# Patient Record
Sex: FEMALE | Race: WHITE | NOT HISPANIC OR LATINO | Employment: UNEMPLOYED | URBAN - METROPOLITAN AREA
[De-identification: names, ages, dates, MRNs, and addresses within clinical notes are randomized per-mention and may not be internally consistent; named-entity substitution may affect disease eponyms.]

---

## 2017-05-11 DIAGNOSIS — G35 MS (MULTIPLE SCLEROSIS) (HCC): ICD-10-CM

## 2017-05-11 RX ORDER — TERIFLUNOMIDE 14 MG/1
14 TABLET, FILM COATED ORAL
Qty: 90 TAB | Refills: 3 | Status: SHIPPED | OUTPATIENT
Start: 2017-05-11 | End: 2018-04-02 | Stop reason: SDUPTHER

## 2018-04-02 DIAGNOSIS — G35 MS (MULTIPLE SCLEROSIS) (HCC): ICD-10-CM

## 2018-04-02 RX ORDER — RENAGEL 800 MG/1
TABLET ORAL
Qty: 84 TAB | Refills: 5 | Status: SHIPPED | OUTPATIENT
Start: 2018-04-02 | End: 2018-10-18 | Stop reason: SDUPTHER

## 2018-10-18 DIAGNOSIS — G35 MS (MULTIPLE SCLEROSIS) (HCC): ICD-10-CM

## 2018-10-18 RX ORDER — TERIFLUNOMIDE 14 MG/1
14 TABLET, FILM COATED ORAL DAILY
Qty: 84 TAB | Refills: 11 | Status: SHIPPED | OUTPATIENT
Start: 2018-10-18 | End: 2023-04-25

## 2018-11-28 ENCOUNTER — OFFICE VISIT (OUTPATIENT)
Dept: NEUROLOGY | Facility: MEDICAL CENTER | Age: 33
End: 2018-11-28
Payer: MEDICAID

## 2018-11-28 VITALS
SYSTOLIC BLOOD PRESSURE: 104 MMHG | TEMPERATURE: 98.8 F | HEART RATE: 76 BPM | WEIGHT: 177.9 LBS | HEIGHT: 72 IN | DIASTOLIC BLOOD PRESSURE: 60 MMHG | OXYGEN SATURATION: 98 % | BODY MASS INDEX: 24.09 KG/M2

## 2018-11-28 DIAGNOSIS — G35 MS (MULTIPLE SCLEROSIS) (HCC): Primary | ICD-10-CM

## 2018-11-28 PROCEDURE — 99214 OFFICE O/P EST MOD 30 MIN: CPT | Performed by: PSYCHIATRY & NEUROLOGY

## 2018-11-28 RX ORDER — GABAPENTIN 300 MG/1
600 CAPSULE ORAL 3 TIMES DAILY
Qty: 270 CAP | Refills: 3 | Status: SHIPPED | OUTPATIENT
Start: 2018-11-28 | End: 2019-05-28

## 2018-11-28 RX ORDER — CYCLOBENZAPRINE HCL 10 MG
10 TABLET ORAL 3 TIMES DAILY PRN
COMMUNITY
End: 2018-11-28 | Stop reason: SDUPTHER

## 2018-11-28 RX ORDER — CYCLOBENZAPRINE HCL 10 MG
10 TABLET ORAL 3 TIMES DAILY PRN
Qty: 120 TAB | Refills: 3 | Status: SHIPPED | OUTPATIENT
Start: 2018-11-28 | End: 2023-04-25

## 2018-11-28 RX ORDER — DULOXETIN HYDROCHLORIDE 60 MG/1
60 CAPSULE, DELAYED RELEASE ORAL DAILY
Qty: 30 CAP | Refills: 2 | Status: SHIPPED | OUTPATIENT
Start: 2018-11-28 | End: 2019-02-15 | Stop reason: SDUPTHER

## 2018-11-28 ASSESSMENT — ENCOUNTER SYMPTOMS
LOSS OF CONSCIOUSNESS: 0
INSOMNIA: 0
TREMORS: 0
DEPRESSION: 0
SPEECH CHANGE: 0
FALLS: 0
SEIZURES: 0
HALLUCINATIONS: 0
NERVOUS/ANXIOUS: 1
HEADACHES: 0
SENSORY CHANGE: 0
DIZZINESS: 0
MEMORY LOSS: 0

## 2018-11-28 ASSESSMENT — PATIENT HEALTH QUESTIONNAIRE - PHQ9
5. POOR APPETITE OR OVEREATING: 2 - MORE THAN HALF THE DAYS
CLINICAL INTERPRETATION OF PHQ2 SCORE: 1
SUM OF ALL RESPONSES TO PHQ QUESTIONS 1-9: 11

## 2018-11-29 NOTE — PROGRESS NOTES
Subjective:      Marge Goodman is a 33 y.o. female who presents for follow-up, after a 2-year hiatus, with a history of MS.    HPI    Since last seen, Yen states that her disease symptomatically has remained stable though she is now having greater difficulty with emotional control.  Though she gets anxious, the anxiety and agitation seem to be triggered with smaller and smaller things, the intensity and severity of her emotional response also out of control and at times frightening.  She is sleeping well, she denies feeling actually depressed.  There have been no issues with impulse control or risk taking.    Cognition actually has improved slightly, she denies any new issues with slurring of speech, double vision or visual loss.  She still has some postural instability, though she does not fall with regularity.  Spasticity is still apparent, she now uses Flexeril with good help, she is stop naproxen.  Bowel and bladder function are stable.  She denies sensory loss in the feet or hands, there is no history of Lhermitte's phenomena.    I reviewed the electronic health record, when I had last seen her, I had ordered a repeat MRI of the brain, this showed a slight increase in size of the pontine lesion she had had before, no evidence of new lesions elsewhere, no enhancement anywhere.  Imaging of the cervical thoracic cords had been done earlier, these were completely normal, thus they were not repeated.    She remains on Aubagio 14 mg daily, with good tolerability.  Blood work was last drawn before the drug was started, CMP and CBC were normal.    Medical, surgical and family histories are reviewed in the electronic health record, there are no new drug allergies.  There are no new medical diagnoses documented.  There is a very strong family history of bipolar disease.  She herself has never been diagnosed.  She is on Flexeril 10 mg, 3 times daily as needed, Neurontin 600 mg, 3 times daily, Aubagio 14 mg daily  and Phenergan as needed.  She and her  are using vitamins to help with her condition, asking for a letter for medical needs to justify the expense and have the state cover the cost.    Review of Systems   Constitutional: Negative for malaise/fatigue.   Musculoskeletal: Negative for falls.   Neurological: Negative for dizziness, tremors, sensory change, speech change, seizures, loss of consciousness and headaches.   Psychiatric/Behavioral: Negative for depression, hallucinations and memory loss. The patient is nervous/anxious. The patient does not have insomnia.    All other systems reviewed and are negative.       Objective:     /60 (BP Location: Right arm, Patient Position: Sitting, BP Cuff Size: Small adult)   Pulse 76   Temp 37.1 °C (98.8 °F) (Temporal)   Ht 1.829 m (6')   Wt 80.7 kg (177 lb 14.4 oz)   LMP 04/01/2016   SpO2 98%   BMI 24.13 kg/m²      Physical Exam    She appears in no acute distress.  Vital signs are stable.  There is no malar rash.  The neck is supple, range of motion is full, Lhermitte's phenomena is absent.  Cardiac evaluation is unremarkable.    Cognition is intact.  PERRLA/EOMI, visual fields are grossly full, there is no bulbar dysfunction, facial movements are symmetric.  She stands easily, there is no obvious weakness, moving all extremities symmetrically and without issue.  There is no appendicular dystaxia with any of the extremities.  She walks with normal station, arm swing is symmetric.  Fine motor control with the hands is intact.  Romberg is absent.     Assessment/Plan:     1. MS (multiple sclerosis) (HCC)  Clinically things seem to be doing well but I am concerned about the emotional problems she is having.  Given her risk based on family history, Cymbalta 60 mg will be started, increased as needed and tolerated.  Side effects were reviewed.  TCAs do need to be avoided given the possibility of bipolar disease underlying all of the above.  She was told that this  is likely part of the MS itself, if not an independent psychiatric condition, it is certainly not related to the Aubagio.    In regards to her disease itself, given the abnormalities on her imaging from 2 years ago, repeat MRI of the brain with and without contrast is necessary, as is CBC and CMP.  The Aubagio will be continued for now.  If MRI is abnormal, consistent with active disease, then we will need to change her to a different medication.  We will call her with results if they are abnormal, otherwise phone contact to adjust her Cymbalta and we can review specifics about test results when I follow up with her in the office.  I can dictate a note indicating the medical necessity of the vitamins that she needs, she will notify the office as to which ones and doses.    - MR-BRAIN-WITH & W/O; Future  - COMP METABOLIC PANEL; Future  - CBC WITH DIFFERENTIAL; Future  - DULoxetine (CYMBALTA) 60 MG Cap DR Particles delayed-release capsule; Take 1 Cap by mouth every day.  Dispense: 30 Cap; Refill: 2    Time: 25 minutes spent face-to-face for exam, review, discussion, and education, of this over 50% of the time spent counseling and coordinating care.

## 2018-12-07 ENCOUNTER — HOSPITAL ENCOUNTER (OUTPATIENT)
Dept: LAB | Facility: MEDICAL CENTER | Age: 33
End: 2018-12-07
Attending: PSYCHIATRY & NEUROLOGY
Payer: MEDICAID

## 2018-12-07 DIAGNOSIS — G35 MS (MULTIPLE SCLEROSIS) (HCC): ICD-10-CM

## 2018-12-07 LAB
ALBUMIN SERPL BCP-MCNC: 4.1 G/DL (ref 3.2–4.9)
ALBUMIN/GLOB SERPL: 1.4 G/DL
ALP SERPL-CCNC: 61 U/L (ref 30–99)
ALT SERPL-CCNC: 24 U/L (ref 2–50)
ANION GAP SERPL CALC-SCNC: 9 MMOL/L (ref 0–11.9)
AST SERPL-CCNC: 28 U/L (ref 12–45)
BASOPHILS # BLD AUTO: 1.6 % (ref 0–1.8)
BASOPHILS # BLD: 0.07 K/UL (ref 0–0.12)
BILIRUB SERPL-MCNC: 0.7 MG/DL (ref 0.1–1.5)
BUN SERPL-MCNC: 21 MG/DL (ref 8–22)
CALCIUM SERPL-MCNC: 9 MG/DL (ref 8.5–10.5)
CHLORIDE SERPL-SCNC: 106 MMOL/L (ref 96–112)
CO2 SERPL-SCNC: 26 MMOL/L (ref 20–33)
CREAT SERPL-MCNC: 0.78 MG/DL (ref 0.5–1.4)
EOSINOPHIL # BLD AUTO: 0.15 K/UL (ref 0–0.51)
EOSINOPHIL NFR BLD: 3.4 % (ref 0–6.9)
ERYTHROCYTE [DISTWIDTH] IN BLOOD BY AUTOMATED COUNT: 41.9 FL (ref 35.9–50)
GLOBULIN SER CALC-MCNC: 2.9 G/DL (ref 1.9–3.5)
GLUCOSE SERPL-MCNC: 82 MG/DL (ref 65–99)
HCT VFR BLD AUTO: 40.2 % (ref 37–47)
HGB BLD-MCNC: 12.9 G/DL (ref 12–16)
IMM GRANULOCYTES # BLD AUTO: 0.01 K/UL (ref 0–0.11)
IMM GRANULOCYTES NFR BLD AUTO: 0.2 % (ref 0–0.9)
LYMPHOCYTES # BLD AUTO: 1.86 K/UL (ref 1–4.8)
LYMPHOCYTES NFR BLD: 42.6 % (ref 22–41)
MCH RBC QN AUTO: 28.6 PG (ref 27–33)
MCHC RBC AUTO-ENTMCNC: 32.1 G/DL (ref 33.6–35)
MCV RBC AUTO: 89.1 FL (ref 81.4–97.8)
MONOCYTES # BLD AUTO: 0.36 K/UL (ref 0–0.85)
MONOCYTES NFR BLD AUTO: 8.2 % (ref 0–13.4)
NEUTROPHILS # BLD AUTO: 1.92 K/UL (ref 2–7.15)
NEUTROPHILS NFR BLD: 44 % (ref 44–72)
NRBC # BLD AUTO: 0 K/UL
NRBC BLD-RTO: 0 /100 WBC
PLATELET # BLD AUTO: 200 K/UL (ref 164–446)
PMV BLD AUTO: 11.5 FL (ref 9–12.9)
POTASSIUM SERPL-SCNC: 3.4 MMOL/L (ref 3.6–5.5)
PROT SERPL-MCNC: 7 G/DL (ref 6–8.2)
RBC # BLD AUTO: 4.51 M/UL (ref 4.2–5.4)
SODIUM SERPL-SCNC: 141 MMOL/L (ref 135–145)
WBC # BLD AUTO: 4.4 K/UL (ref 4.8–10.8)

## 2018-12-07 PROCEDURE — 85025 COMPLETE CBC W/AUTO DIFF WBC: CPT

## 2018-12-07 PROCEDURE — 36415 COLL VENOUS BLD VENIPUNCTURE: CPT

## 2018-12-07 PROCEDURE — 80053 COMPREHEN METABOLIC PANEL: CPT

## 2019-02-15 DIAGNOSIS — G35 MS (MULTIPLE SCLEROSIS) (HCC): ICD-10-CM

## 2019-02-19 RX ORDER — DULOXETIN HYDROCHLORIDE 60 MG/1
CAPSULE, DELAYED RELEASE ORAL
Qty: 90 CAP | Refills: 3 | Status: SHIPPED | OUTPATIENT
Start: 2019-02-19 | End: 2023-04-25

## 2019-05-28 ENCOUNTER — OFFICE VISIT (OUTPATIENT)
Dept: NEUROLOGY | Facility: MEDICAL CENTER | Age: 34
End: 2019-05-28
Payer: MEDICAID

## 2019-05-28 VITALS
WEIGHT: 192.2 LBS | HEART RATE: 85 BPM | OXYGEN SATURATION: 96 % | BODY MASS INDEX: 26.03 KG/M2 | RESPIRATION RATE: 17 BRPM | DIASTOLIC BLOOD PRESSURE: 58 MMHG | TEMPERATURE: 97.6 F | HEIGHT: 72 IN | SYSTOLIC BLOOD PRESSURE: 100 MMHG

## 2019-05-28 DIAGNOSIS — M54.16 BILATERAL LUMBAR RADICULOPATHY: ICD-10-CM

## 2019-05-28 DIAGNOSIS — G35 MS (MULTIPLE SCLEROSIS) (HCC): Primary | ICD-10-CM

## 2019-05-28 PROCEDURE — 99214 OFFICE O/P EST MOD 30 MIN: CPT | Performed by: PSYCHIATRY & NEUROLOGY

## 2019-05-28 RX ORDER — MULTIVIT WITH MINERALS/LUTEIN
1 TABLET ORAL DAILY
COMMUNITY

## 2019-05-28 RX ORDER — NAPROXEN SODIUM 550 MG/1
550 TABLET ORAL 2 TIMES DAILY WITH MEALS
Qty: 60 TAB | Refills: 5 | Status: SHIPPED | OUTPATIENT
Start: 2019-05-28 | End: 2023-04-25

## 2019-05-28 RX ORDER — METHYLPREDNISOLONE 4 MG/1
TABLET ORAL
Qty: 1 KIT | Refills: 0 | Status: SHIPPED | OUTPATIENT
Start: 2019-05-28 | End: 2023-04-25

## 2019-05-28 RX ORDER — ASPIRIN 81 MG/1
81 TABLET, CHEWABLE ORAL DAILY
COMMUNITY

## 2019-05-28 RX ORDER — ASCORBIC ACID 500 MG
500 TABLET ORAL DAILY
COMMUNITY

## 2019-05-28 ASSESSMENT — PATIENT HEALTH QUESTIONNAIRE - PHQ9: CLINICAL INTERPRETATION OF PHQ2 SCORE: 0

## 2019-05-28 ASSESSMENT — ENCOUNTER SYMPTOMS
MEMORY LOSS: 0
SENSORY CHANGE: 1
DOUBLE VISION: 0
BACK PAIN: 1
BLURRED VISION: 0
FALLS: 0
CONSTIPATION: 0

## 2019-05-28 ASSESSMENT — PAIN SCALES - GENERAL: PAINLEVEL: 8=MODERATE-SEVERE PAIN

## 2019-05-29 NOTE — PROGRESS NOTES
Subjective:      Marge Chand is a 34 y.o. female who presents for follow-up, with a history of MS, but who has had ever increasing problems with bilateral lower externally pain and sciatica.    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  This is worsened with physical exertion, even standing and Valsalva can cause radiation of symptoms into both legs.  She denies any changes in bowel or bladder function or its.  She has even greater difficulty sitting, always having to shift from side to side to avoid pain shooting into the leg ipsilaterally.  She has not had any imaging studies done.  She simply continues to suffer through.    MS: Since seen 6 months ago, she denies any episodes suggestive of disease relapse, though her symptoms subjectively worsen when her back pain begins to act up.  The emotional lability she had been suffering from was well controlled on Cymbalta, but she ran out and forgot to refill the drug, and thus she has become much more agitated and even aggressive, something that is quite distracting for her.  Cognition remains intact.  Vision and bulbar functions are also maintained.  There is still some slight gait instability though she does not fall, spasticity has gotten a little bit worse in the legs in association with the back pain.  Bowel and bladder function are stable.    Unfortunately, she never followed through with the blood work and MRI of the brain that I had asked for back in November, 2018.  She is on Aubagio 14 mg daily.  Again CBC and CMP have yet to be drawn.    Medical, surgical and family histories are reviewed in the electronic health record, there are no new drug allergies.  She is on Aubagio 14 mg daily,  Flexeril, baby aspirin daily, the rest as per electronic health record, noncontributory from my standpoint.    Review of Systems   Constitutional: Negative for malaise/fatigue.   Eyes: Negative for blurred vision and double vision.   Gastrointestinal: Negative for constipation.   Genitourinary: Negative for dysuria.   Musculoskeletal: Positive for back pain. Negative for falls.   Neurological: Positive for sensory change.   Psychiatric/Behavioral: Negative for memory loss.   All other systems reviewed and are negative.       Objective:     /58 (BP Location: Left arm, Patient Position: Sitting)   Pulse 85   Temp 36.4 °C (97.6 °F) (Temporal)   Resp 17   Ht 1.829 m (6')   Wt 87.2 kg (192 lb 3.2 oz)   LMP 04/01/2016   SpO2 96%   BMI 26.07 kg/m²      Physical Exam    She appears in no acute distress.  Vital signs are stable.  There is no malar rash.  The neck is supple, there meets phenomena is absent.  Cardiac evaluation is unremarkable.  Straight leg raising is positive bilaterally, there was a cross straight leg raising manipulating the right leg, pain radiating into the left lower extremity.    Cognition is intact.  PERRLA/EOMI, visual fields are full, facial movements are symmetric and sensory exam is intact to light touch bilaterally.  The tongue and uvula are midline.  Jaw jerk is absent.    Musculoskeletal exam in the upper extremities shows normal tone without tremor or drift.  Strength is intact.  In the lower extremities there may be a little weakness with dorsiflexion but there is also significant pain.  There is no weakness elsewhere at the knees or proximally.  Reflexes are brisk and present in all 4 extremities, in the lower extremities none are dropped.  Both toes are downgoing.    She has to stand to maintain comfort, she sits from side to side when seated.  There is no appendicular dystaxia, fine motor control with the feet and hands is intact.  Vibration and temperature are intact  bilaterally.     Assessment/Plan:     1. MS (multiple sclerosis) (HCC)  Clinically stable, I slapped her wrist and told her to get in for an MRI scan of the brain sooner rather than later.  She understands the reasons for this.  Aubagio 14 mg daily will be continued, she was also told to get a CBC and CMP checked, orders already in the computer.    - MR-BRAIN-WITH & W/O; Future  - Renal Function Panel; Future    2. Bilateral lumbar radiculopathy  I am a little more concerned about this, there is a clear dynamic process, and though not claudicating in nature, things may be on their way.  MRI of the lumbar spine should be obtained, Medrol Dosepak provided for the pain that she has followed by Anaprox  mg, twice daily.  Phone contact in the interim, we will call her with the MRI results if they are significant.  Given the heavy labor that she is now engaged in, she was told that if she does not back off, things will get worse before they get better.  Though I recommended physical therapy, she is opting out given how inconvenience it is to attend.    - MR-LUMBAR SPINE-W/O; Future  - MethylPREDNISolone (MEDROL DOSEPAK) 4 MG Tablet Therapy Pack; As directed in the kit, 6 tab qAM on day 1, reduce by 1 tab daily until off  Dispense: 1 Kit; Refill: 0  - naproxen sodium (ANAPROX) 550 MG tablet; Take 1 Tab by mouth 2 times a day, with meals.  Dispense: 60 Tab; Refill: 5    Time: 25 minutes spent face-to-face for exam, review, discussion, and education, of this over 60% of the time spent counseling and coordinating care surrounding all of the above issues.

## 2019-06-17 ENCOUNTER — HOSPITAL ENCOUNTER (OUTPATIENT)
Dept: RADIOLOGY | Facility: MEDICAL CENTER | Age: 34
End: 2019-06-17
Attending: PSYCHIATRY & NEUROLOGY
Payer: MEDICAID

## 2019-06-17 DIAGNOSIS — M54.16 BILATERAL LUMBAR RADICULOPATHY: ICD-10-CM

## 2019-06-17 DIAGNOSIS — G35 MS (MULTIPLE SCLEROSIS) (HCC): ICD-10-CM

## 2019-06-17 PROCEDURE — 70553 MRI BRAIN STEM W/O & W/DYE: CPT

## 2019-06-17 PROCEDURE — A9585 GADOBUTROL INJECTION: HCPCS | Performed by: PSYCHIATRY & NEUROLOGY

## 2019-06-17 PROCEDURE — 72148 MRI LUMBAR SPINE W/O DYE: CPT

## 2019-06-17 PROCEDURE — 700117 HCHG RX CONTRAST REV CODE 255: Performed by: PSYCHIATRY & NEUROLOGY

## 2019-06-17 RX ORDER — GADOBUTROL 604.72 MG/ML
9 INJECTION INTRAVENOUS ONCE
Status: COMPLETED | OUTPATIENT
Start: 2019-06-17 | End: 2019-06-17

## 2019-06-17 RX ADMIN — GADOBUTROL 9 ML: 604.72 INJECTION INTRAVENOUS at 14:50

## 2023-04-25 ENCOUNTER — OFFICE VISIT (OUTPATIENT)
Dept: URGENT CARE | Facility: PHYSICIAN GROUP | Age: 38
End: 2023-04-25
Payer: MEDICAID

## 2023-04-25 VITALS
TEMPERATURE: 97.4 F | OXYGEN SATURATION: 99 % | WEIGHT: 230 LBS | SYSTOLIC BLOOD PRESSURE: 118 MMHG | HEIGHT: 71 IN | HEART RATE: 94 BPM | DIASTOLIC BLOOD PRESSURE: 78 MMHG | RESPIRATION RATE: 16 BRPM | BODY MASS INDEX: 32.2 KG/M2

## 2023-04-25 DIAGNOSIS — F17.210 TOBACCO DEPENDENCE DUE TO CIGARETTES: ICD-10-CM

## 2023-04-25 DIAGNOSIS — J06.9 VIRAL URI WITH COUGH: ICD-10-CM

## 2023-04-25 PROCEDURE — 99406 BEHAV CHNG SMOKING 3-10 MIN: CPT | Performed by: NURSE PRACTITIONER

## 2023-04-25 PROCEDURE — 99203 OFFICE O/P NEW LOW 30 MIN: CPT | Mod: 25 | Performed by: NURSE PRACTITIONER

## 2023-04-25 RX ORDER — GUAIFENESIN 600 MG/1
600 TABLET, EXTENDED RELEASE ORAL EVERY 12 HOURS
Qty: 28 TABLET | Refills: 0 | Status: SHIPPED | OUTPATIENT
Start: 2023-04-25 | End: 2023-05-09

## 2023-04-25 ASSESSMENT — ENCOUNTER SYMPTOMS
NAUSEA: 0
SORE THROAT: 0
ABDOMINAL PAIN: 0
PALPITATIONS: 0
CHILLS: 0
COUGH: 1
SINUS PAIN: 0
WHEEZING: 0
EYE PAIN: 0
DIARRHEA: 0
SHORTNESS OF BREATH: 0
SPUTUM PRODUCTION: 1
EYE REDNESS: 0
FEVER: 0
EYE DISCHARGE: 0
HEADACHES: 0
VOMITING: 0
MYALGIAS: 0

## 2023-04-25 NOTE — PROGRESS NOTES
Patient has consented to treatment and for use of patient information for treatment and billing purposes.    Chief Complaint:    Chief Complaint   Patient presents with    Cough     X3 days Cough, phlegm, congested  has bacterial pneumonia         History of Present Illness: 37 y.o. female  presents 3-day history of cough with green to yellow phlegm that is worse in the morning with mild intermittent nasal congestion.  Patient denies any fever, chills, ear pain, shortness of breath, wheezing, or headaches.  She does state that she is a smoker however is reducing her cigarette use.  She is concerned as her  was recently diagnosed with bacterial pneumonia and she was worried and wanted to be checked out by a provider.    No known exposure to COVID, RSV, influenza, or strep throat      Review of Systems   Constitutional:  Negative for chills, fever and malaise/fatigue.   HENT:  Positive for congestion. Negative for ear pain, sinus pain and sore throat.    Eyes:  Negative for pain, discharge and redness.   Respiratory:  Positive for cough and sputum production. Negative for shortness of breath and wheezing.    Cardiovascular:  Negative for chest pain and palpitations.   Gastrointestinal:  Negative for abdominal pain, diarrhea, nausea and vomiting.   Musculoskeletal:  Negative for myalgias.   Neurological:  Negative for headaches.       Medications, Allergies, and current problem list reviewed today in Epic.    Physical Exam:  Vitals:    04/25/23 1508   BP: 118/78   Pulse: 94   Resp: 16   Temp: 36.3 °C (97.4 °F)   SpO2: 99%        Physical Exam  Vitals reviewed.   Constitutional:       General: She is not in acute distress.     Appearance: Normal appearance. She is not ill-appearing or toxic-appearing.   HENT:      Right Ear: Tympanic membrane, ear canal and external ear normal.      Left Ear: Tympanic membrane, ear canal and external ear normal.      Nose: Nose normal.      Mouth/Throat:      Mouth: Mucous  membranes are moist.      Pharynx: Oropharynx is clear. No oropharyngeal exudate or posterior oropharyngeal erythema.   Cardiovascular:      Rate and Rhythm: Normal rate and regular rhythm.      Heart sounds: No murmur heard.  Pulmonary:      Effort: Pulmonary effort is normal. No respiratory distress.      Breath sounds: Normal breath sounds. No wheezing, rhonchi or rales.   Musculoskeletal:      Cervical back: Normal range of motion and neck supple.   Lymphadenopathy:      Cervical: No cervical adenopathy.   Skin:     General: Skin is warm and dry.   Neurological:      Mental Status: She is alert.   Psychiatric:         Mood and Affect: Mood normal.            Medical Decision Making:  I personally reviewed prior external notes and test results pertinent to today's visit.   Shared decision-making was utilized with patient did develop treatment plan and clinic course.   Pleasant, 37 y.o. female   presents 3-day history of cough with green to yellow phlegm that is worse in the morning with mild intermittent nasal congestion.  Patient denies any fever, chills, ear pain, shortness of breath, wheezing, or headaches.        Acute problem today .   Discussed Dx, management options (risks,benefits, and alternatives to planned treatment), and natural progression .    Educated in proper administration of  prescription medication(s) ordered today including safety, possible SE, risks, benefits, rationale and alternatives to therapy.   Did advise patient on conservative measures for management of symptoms.  Patient is agreeable to pursue adequate rest, adequate hydration, saltwater gargle and Neti pot for any symptoms of upper respiratory congestion.  Over-the-counter analgesia and antipyretics on a p.r.n. basis as needed for pain and fever.  Did discuss over-the-counter cough medications and  antihistamine of choice. Follow manufactures dosing and safety guidelines.    Humidifier at night prn   Discussed looking cessation  full the risks associated with tobacco use with patient for 4 minutes during her exam.  Patient was recommended to quit smoking.    Patient will monitor symptoms closely for worsening and is advised to seek further evaluation the emergency room if alarm signs or symptoms arise.    Patient states understanding and verbalizes agreement with this plan of care.    Plan:  Verbal and/or printed education was provided regarding the assessment and diagnosis.  All of the patient's questions were answered to their satisfaction at the time of discharge.    1. Viral URI with cough  - guaiFENesin ER (MUCINEX) 600 MG TABLET SR 12 HR; Take 1 Tablet by mouth every 12 hours for 14 days.  Dispense: 28 Tablet; Refill: 0    2. Tobacco dependence due to cigarettes        Disposition:  Patient was discharged in stable condition.    Voice Recognition Disclaimer: Portions of this document were created using voice recognition software. The software does have a chance of producing errors of grammar and possibly content. I have made every reasonable attempt to correct obvious errors, but there may be errors of grammar and possibly content that I did not discover before finalizing the documentation.

## 2023-05-12 ENCOUNTER — HOSPITAL ENCOUNTER (OUTPATIENT)
Facility: MEDICAL CENTER | Age: 38
End: 2023-05-12
Attending: NURSE PRACTITIONER
Payer: MEDICAID

## 2023-05-12 ENCOUNTER — OFFICE VISIT (OUTPATIENT)
Dept: URGENT CARE | Facility: PHYSICIAN GROUP | Age: 38
End: 2023-05-12
Payer: MEDICAID

## 2023-05-12 VITALS
HEIGHT: 71 IN | RESPIRATION RATE: 16 BRPM | TEMPERATURE: 98 F | OXYGEN SATURATION: 99 % | WEIGHT: 230 LBS | HEART RATE: 96 BPM | BODY MASS INDEX: 32.2 KG/M2 | DIASTOLIC BLOOD PRESSURE: 78 MMHG | SYSTOLIC BLOOD PRESSURE: 132 MMHG

## 2023-05-12 DIAGNOSIS — N30.01 ACUTE CYSTITIS WITH HEMATURIA: ICD-10-CM

## 2023-05-12 LAB
APPEARANCE UR: NORMAL
BILIRUB UR STRIP-MCNC: NEGATIVE MG/DL
COLOR UR AUTO: NORMAL
GLUCOSE UR STRIP.AUTO-MCNC: NEGATIVE MG/DL
KETONES UR STRIP.AUTO-MCNC: NEGATIVE MG/DL
LEUKOCYTE ESTERASE UR QL STRIP.AUTO: NORMAL
NITRITE UR QL STRIP.AUTO: POSITIVE
PH UR STRIP.AUTO: 5.5 [PH] (ref 5–8)
PROT UR QL STRIP: 30 MG/DL
RBC UR QL AUTO: NORMAL
SP GR UR STRIP.AUTO: >=1.03
UROBILINOGEN UR STRIP-MCNC: 0.2 MG/DL

## 2023-05-12 PROCEDURE — 3075F SYST BP GE 130 - 139MM HG: CPT | Performed by: NURSE PRACTITIONER

## 2023-05-12 PROCEDURE — 87086 URINE CULTURE/COLONY COUNT: CPT

## 2023-05-12 PROCEDURE — 3078F DIAST BP <80 MM HG: CPT | Performed by: NURSE PRACTITIONER

## 2023-05-12 PROCEDURE — 99213 OFFICE O/P EST LOW 20 MIN: CPT | Mod: 25 | Performed by: NURSE PRACTITIONER

## 2023-05-12 PROCEDURE — 81002 URINALYSIS NONAUTO W/O SCOPE: CPT | Performed by: NURSE PRACTITIONER

## 2023-05-12 PROCEDURE — 1125F AMNT PAIN NOTED PAIN PRSNT: CPT | Performed by: NURSE PRACTITIONER

## 2023-05-12 PROCEDURE — 87186 SC STD MICRODIL/AGAR DIL: CPT

## 2023-05-12 PROCEDURE — 87077 CULTURE AEROBIC IDENTIFY: CPT

## 2023-05-12 RX ORDER — PHENAZOPYRIDINE HYDROCHLORIDE 200 MG/1
200 TABLET, FILM COATED ORAL 3 TIMES DAILY PRN
Qty: 6 TABLET | Refills: 0 | Status: SHIPPED | OUTPATIENT
Start: 2023-05-12 | End: 2023-10-23

## 2023-05-12 RX ORDER — NITROFURANTOIN 25; 75 MG/1; MG/1
100 CAPSULE ORAL 2 TIMES DAILY
Qty: 10 CAPSULE | Refills: 0 | Status: SHIPPED | OUTPATIENT
Start: 2023-05-12 | End: 2023-05-17

## 2023-05-13 DIAGNOSIS — N30.01 ACUTE CYSTITIS WITH HEMATURIA: ICD-10-CM

## 2023-05-13 NOTE — PROGRESS NOTES
Patient has consented to treatment and for use of patient information for treatment and billing purposes.    Chief Complaint:    Chief Complaint   Patient presents with    Dysuria     X1 Day UTI symptoms,Burning sensation, cramping, urgency,  started taking Cranberry gummies, is currently on her cycle and only when she wipes,         History of Present Illness: 37 y.o.  female presents to clinic with 1 day history of burning with urination, lower abdominal cramping, urgency with urination, and frequency with urination.  Patient has been taking over-the-counter cranberry Gummies which has not helped with symptoms.  She denies any fevers, chills, or flank pain.  Patient denies any abnormal vaginal discharge.  She reports she has had tubal ligation.  Patient is currently mentating.        Medications, Allergies, and current problem list reviewed today in Epic.    Physical Exam:    Vitals:    05/12/23 1716   BP: 132/78   Pulse: 96   Resp: 16   Temp: 36.7 °C (98 °F)   SpO2: 99%             Physical Exam  Constitutional:       Appearance: Normal appearance. She is not ill-appearing or toxic-appearing.   Cardiovascular:      Rate and Rhythm: Normal rate.   Pulmonary:      Effort: Pulmonary effort is normal.   Abdominal:      General: Abdomen is flat. Bowel sounds are normal. There is no distension.      Palpations: Abdomen is soft. There is no mass.      Tenderness: There is no abdominal tenderness. There is no right CVA tenderness, left CVA tenderness, guarding or rebound.      Hernia: No hernia is present.   Musculoskeletal:      Cervical back: Normal range of motion.   Skin:     General: Skin is warm and dry.   Neurological:      Mental Status: She is alert.   Psychiatric:         Mood and Affect: Mood normal.          Diagnostics:    Results for orders placed or performed in visit on 05/12/23   POCT Urinalysis   Result Value Ref Range    POC Color Maryanne Negative    POC Appearance Cloudy Negative    POC Glucose Negative  Negative mg/dL    POC Bilirubin Negative Negative mg/dL    POC Ketones Negative Negative mg/dL    POC Specific Gravity >=1.030 <1.005 - >1.030    POC Blood Large Negative    POC Urine PH 5.5 5.0 - 8.0    POC Protein 30 Negative mg/dL    POC Urobiligen 0.2 Negative (0.2) mg/dL    POC Nitrites Positive Negative    POC Leukocyte Esterase Moderate Negative         Medical Decision Making and Plan:  I personally reviewed prior external notes and test results pertinent to today's visit.   Shared decision-making was utilized with patient did develop treatment plan and clinic course.     Urine sent for culture.    She was  started on Macrobid as this is worked well for her in the past.  Prescription was sent in to preferred pharmacy. AVS was given and reviewed with patient. Patient educated on red flags of UTI and encouraged to seek care back in UC or ER for  fever, chills, flank pain, or worsening symptoms.   Pt educated on red flags and when to seek treatment back in ER or UC.     Differential diagnosis, natural history, supportive care, and indications for immediate follow-up discussed.    The patient remained stable during the urgent care visit.          1. Acute cystitis with hematuria  - POCT Urinalysis  - nitrofurantoin (MACROBID) 100 MG Cap; Take 1 Capsule by mouth 2 times a day for 5 days.  Dispense: 10 Capsule; Refill: 0  - phenazopyridine (PYRIDIUM) 200 MG Tab; Take 1 Tablet by mouth 3 times a day as needed for Moderate Pain.  Dispense: 6 Tablet; Refill: 0  - URINE CULTURE(NEW); Future      Patient was encouraged to monitor symptoms closely. Those signs and symptoms which would warrant concern and mandate seeking a higher level of service through the emergency department discussed at length.  Patient stated agreement and understanding of this plan of care.    Follow up:  Advised the patient to follow-up with the primary care physician for recheck, reevaluation, and consideration of further management.  Patient  verbalized understanding of treatment plan and has no further questions regarding care.    Follow-up in urgent care or emergency department if symptoms continue or worsen in 3-5 days      Disposition:  Home in stable condition       Voice Recognition Disclaimer:  Portions of this document were created using voice recognition software. The software does have a chance of producing errors of grammar and possibly content. I have made every reasonable attempt to correct obvious errors, but there may be errors of grammar and possibly content that I did not discover before finalizing the documentation.

## 2023-05-15 LAB
BACTERIA UR CULT: ABNORMAL
BACTERIA UR CULT: ABNORMAL
SIGNIFICANT IND 70042: ABNORMAL
SITE SITE: ABNORMAL
SOURCE SOURCE: ABNORMAL

## 2023-10-23 ENCOUNTER — OFFICE VISIT (OUTPATIENT)
Dept: URGENT CARE | Facility: PHYSICIAN GROUP | Age: 38
End: 2023-10-23
Payer: MEDICAID

## 2023-10-23 ENCOUNTER — HOSPITAL ENCOUNTER (OUTPATIENT)
Facility: MEDICAL CENTER | Age: 38
End: 2023-10-23
Attending: PHYSICIAN ASSISTANT
Payer: MEDICAID

## 2023-10-23 VITALS
HEIGHT: 71 IN | OXYGEN SATURATION: 98 % | TEMPERATURE: 97.5 F | RESPIRATION RATE: 18 BRPM | HEART RATE: 104 BPM | SYSTOLIC BLOOD PRESSURE: 110 MMHG | DIASTOLIC BLOOD PRESSURE: 70 MMHG | BODY MASS INDEX: 32.06 KG/M2 | WEIGHT: 229 LBS

## 2023-10-23 DIAGNOSIS — R30.0 DYSURIA: ICD-10-CM

## 2023-10-23 DIAGNOSIS — N89.8 VAGINAL DISCHARGE: ICD-10-CM

## 2023-10-23 DIAGNOSIS — R39.9 LOWER URINARY TRACT SYMPTOMS (LUTS): ICD-10-CM

## 2023-10-23 LAB
APPEARANCE UR: NORMAL
BILIRUB UR STRIP-MCNC: NORMAL MG/DL
COLOR UR AUTO: NORMAL
GLUCOSE UR STRIP.AUTO-MCNC: NORMAL MG/DL
KETONES UR STRIP.AUTO-MCNC: NORMAL MG/DL
LEUKOCYTE ESTERASE UR QL STRIP.AUTO: NORMAL
NITRITE UR QL STRIP.AUTO: NORMAL
PH UR STRIP.AUTO: 5.5 [PH] (ref 5–8)
PROT UR QL STRIP: 100 MG/DL
RBC UR QL AUTO: NORMAL
SP GR UR STRIP.AUTO: >=1.03
UROBILINOGEN UR STRIP-MCNC: 1 MG/DL

## 2023-10-23 PROCEDURE — 87510 GARDNER VAG DNA DIR PROBE: CPT

## 2023-10-23 PROCEDURE — 87480 CANDIDA DNA DIR PROBE: CPT

## 2023-10-23 PROCEDURE — 3074F SYST BP LT 130 MM HG: CPT | Performed by: PHYSICIAN ASSISTANT

## 2023-10-23 PROCEDURE — 99213 OFFICE O/P EST LOW 20 MIN: CPT | Mod: 25 | Performed by: PHYSICIAN ASSISTANT

## 2023-10-23 PROCEDURE — 87086 URINE CULTURE/COLONY COUNT: CPT

## 2023-10-23 PROCEDURE — 87077 CULTURE AEROBIC IDENTIFY: CPT

## 2023-10-23 PROCEDURE — 3078F DIAST BP <80 MM HG: CPT | Performed by: PHYSICIAN ASSISTANT

## 2023-10-23 PROCEDURE — 87186 SC STD MICRODIL/AGAR DIL: CPT

## 2023-10-23 PROCEDURE — 87660 TRICHOMONAS VAGIN DIR PROBE: CPT

## 2023-10-23 PROCEDURE — 81002 URINALYSIS NONAUTO W/O SCOPE: CPT | Performed by: PHYSICIAN ASSISTANT

## 2023-10-23 RX ORDER — PHENAZOPYRIDINE HYDROCHLORIDE 200 MG/1
200 TABLET, FILM COATED ORAL 3 TIMES DAILY PRN
Qty: 6 TABLET | Refills: 0 | Status: SHIPPED | OUTPATIENT
Start: 2023-10-23 | End: 2023-10-25

## 2023-10-23 RX ORDER — NITROFURANTOIN 25; 75 MG/1; MG/1
100 CAPSULE ORAL 2 TIMES DAILY
Qty: 10 CAPSULE | Refills: 0 | Status: SHIPPED | OUTPATIENT
Start: 2023-10-23 | End: 2023-10-28

## 2023-10-23 NOTE — PROGRESS NOTES
Subjective     Marge Goodman is a 38 y.o. female who presents with UTI (3 days, burning urination, cramping)    PMH:  has no past medical history on file.  MEDS:   Current Outpatient Medications:     B Complex-C (SUPER B COMPLEX PO), Take  by mouth., Disp: , Rfl:     vitamin E (VITAMIN E) 1000 UNIT Cap, Take 1 Cap by mouth every day., Disp: , Rfl:     vitamin D (CHOLECALCIFEROL) 1000 UNIT Tab, Take 1,000 Units by mouth every day., Disp: , Rfl:     POTASSIUM PO, Take  by mouth., Disp: , Rfl:     ascorbic acid (ASCORBIC ACID) 500 MG Tab, Take 500 mg by mouth every day., Disp: , Rfl:     aspirin (ASA) 81 MG Chew Tab chewable tablet, Take 81 mg by mouth every day., Disp: , Rfl:     phenazopyridine (PYRIDIUM) 200 MG Tab, Take 1 Tablet by mouth 3 times a day as needed for Moderate Pain. (Patient not taking: Reported on 10/23/2023), Disp: 6 Tablet, Rfl: 0  ALLERGIES:   Allergies   Allergen Reactions    Clindamycin      Difficulty breathing    Percocet [Oxycodone-Acetaminophen]      Severe migraine    Vicodin [Hydrocodone-Acetaminophen]      Migraines     SURGHX:   Past Surgical History:   Procedure Laterality Date    PRIMARY C SECTION      2006     SOCHX:  reports that she has been smoking cigarettes. She has a 9.5 pack-year smoking history. She has never used smokeless tobacco. She reports that she does not drink alcohol and does not use drugs.  FH: Reviewed with patient, not pertinent to this visit.           Patient presents with:  UTI: 3 days, burning urination, cramping as well as some increased vaginal discharge.  PT denies new partner or concern for STI.  PT denies fever, chills, nvd.  PT has had UTI in past, feels same.  No otc medicine for symptoms.         UTI  This is a new problem. The current episode started in the past 7 days. The problem occurs constantly. The problem has been gradually worsening. Pertinent negatives include no fever, rash or vomiting. Exacerbated by: urination. She has tried drinking  "for the symptoms. The treatment provided no relief.       Review of Systems   Constitutional:  Negative for fever.   Gastrointestinal:  Negative for vomiting.   Genitourinary:  Positive for dysuria, frequency and urgency.   Skin:  Negative for rash.   All other systems reviewed and are negative.             Objective     /70   Pulse (!) 104   Temp 36.4 °C (97.5 °F) (Temporal)   Resp 18   Ht 1.803 m (5' 11\")   Wt 104 kg (229 lb)   LMP 07/05/2014   SpO2 98%   BMI 31.94 kg/m²      Physical Exam  Vitals and nursing note reviewed. Chaperone present: pt will self swab, deferred pelvic exam.   Constitutional:       General: She is not in acute distress.     Appearance: She is well-developed.   HENT:      Head: Normocephalic.   Eyes:      Conjunctiva/sclera: Conjunctivae normal.      Pupils: Pupils are equal, round, and reactive to light.   Cardiovascular:      Rate and Rhythm: Normal rate.   Pulmonary:      Effort: Pulmonary effort is normal.      Breath sounds: Normal breath sounds.   Abdominal:      General: Bowel sounds are normal.      Palpations: Abdomen is soft.      Tenderness: There is no guarding or rebound.   Musculoskeletal:         General: Normal range of motion.      Cervical back: Normal range of motion and neck supple.   Skin:     General: Skin is warm and dry.      Capillary Refill: Capillary refill takes less than 2 seconds.   Neurological:      Mental Status: She is alert and oriented to person, place, and time.                             Assessment & Plan              1. Lower urinary tract symptoms (LUTS)  phenazopyridine (PYRIDIUM) 200 MG Tab    nitrofurantoin (MACROBID) 100 MG Cap    URINE CULTURE(NEW)    VAGINAL PATHOGENS DNA PANEL      2. Dysuria  POCT Urinalysis    phenazopyridine (PYRIDIUM) 200 MG Tab    nitrofurantoin (MACROBID) 100 MG Cap    URINE CULTURE(NEW)    VAGINAL PATHOGENS DNA PANEL      3. Vaginal discharge  VAGINAL PATHOGENS DNA PANEL        PT HPI /PE consistent with " UTI, will treat with macrobid x 7 days.      Vag pathogen culture sent to lab, will call with any necessary treatment or treatment changes.     Differential diagnosis, supportive care, and indications for immediate follow-up discussed with patient.  Instructed to return to clinic or nearest emergency department for any change in condition, further concerns, or worsening of symptoms.    I personally reviewed prior external notes and test results pertinent to today's visit.  I have independently reviewed and interpreted all diagnostics ordered during this urgent care visit.    PT should follow up with PCP in 1-2 days for re-evaluation if symptoms have not improved.      Discussed red flags and reasons to return to UC or ED.      Pt and/or family verbalized understanding of diagnosis and follow up instructions and was offered informational handout on diagnosis.  PT discharged.     Please note that this dictation was created using voice recognition software. I have made every reasonable attempt to correct obvious errors, but I expect that there may be errors of grammar and possibly content that I did not discover before finalizing the note.

## 2023-10-24 DIAGNOSIS — R39.9 LOWER URINARY TRACT SYMPTOMS (LUTS): ICD-10-CM

## 2023-10-24 DIAGNOSIS — N89.8 VAGINAL DISCHARGE: ICD-10-CM

## 2023-10-24 DIAGNOSIS — R30.0 DYSURIA: ICD-10-CM

## 2023-10-25 LAB
CANDIDA DNA VAG QL PROBE+SIG AMP: NEGATIVE
G VAGINALIS DNA VAG QL PROBE+SIG AMP: NEGATIVE
T VAGINALIS DNA VAG QL PROBE+SIG AMP: NEGATIVE

## 2023-10-26 ASSESSMENT — ENCOUNTER SYMPTOMS
FEVER: 0
VOMITING: 0

## 2024-09-03 ENCOUNTER — OFFICE VISIT (OUTPATIENT)
Dept: URGENT CARE | Facility: PHYSICIAN GROUP | Age: 39
End: 2024-09-03
Payer: MEDICAID

## 2024-09-03 VITALS
DIASTOLIC BLOOD PRESSURE: 86 MMHG | BODY MASS INDEX: 34.35 KG/M2 | HEART RATE: 100 BPM | WEIGHT: 245.4 LBS | OXYGEN SATURATION: 97 % | RESPIRATION RATE: 16 BRPM | HEIGHT: 71 IN | SYSTOLIC BLOOD PRESSURE: 130 MMHG | TEMPERATURE: 97.6 F

## 2024-09-03 DIAGNOSIS — L84 CALLUS: ICD-10-CM

## 2024-09-03 DIAGNOSIS — G35 MS (MULTIPLE SCLEROSIS) (HCC): ICD-10-CM

## 2024-09-03 DIAGNOSIS — M25.572 CHRONIC PAIN OF LEFT ANKLE: ICD-10-CM

## 2024-09-03 DIAGNOSIS — G89.29 CHRONIC PAIN OF LEFT ANKLE: ICD-10-CM

## 2024-09-03 DIAGNOSIS — K02.9 DENTAL DECAY: ICD-10-CM

## 2024-09-03 PROCEDURE — 99214 OFFICE O/P EST MOD 30 MIN: CPT | Performed by: FAMILY MEDICINE

## 2024-09-03 PROCEDURE — 3079F DIAST BP 80-89 MM HG: CPT | Performed by: FAMILY MEDICINE

## 2024-09-03 PROCEDURE — 3075F SYST BP GE 130 - 139MM HG: CPT | Performed by: FAMILY MEDICINE

## 2024-09-03 RX ORDER — IBUPROFEN 200 MG
200 TABLET ORAL EVERY 6 HOURS PRN
COMMUNITY

## 2024-09-03 RX ORDER — OXYCODONE HYDROCHLORIDE 5 MG/1
5 TABLET ORAL EVERY 6 HOURS PRN
Qty: 10 TABLET | Refills: 0 | Status: SHIPPED | OUTPATIENT
Start: 2024-09-03 | End: 2024-09-10

## 2024-09-03 RX ORDER — AMMONIUM LACTATE 12 G/100G
1 LOTION TOPICAL 2 TIMES DAILY
Qty: 396 G | Refills: 0 | Status: SHIPPED | OUTPATIENT
Start: 2024-09-03

## 2024-09-04 NOTE — PROGRESS NOTES
"Has MS and subsequent gait issues and has chronic left foot/ankle pain, worse with walking and wtbearing.   She uses insoles for \"over-supination\"          Subjective:      Chief Complaint   Patient presents with    Abscess    Foot Pain     Pt states she has an abscessed tooth and experiencing foot pain in her right foot. She is hoping for a referral for her foot.  Abscess is on left side of mouth, started yesterday. Hoping for antibiotics for it. She does not have a dentist currently.              #1  Dental Pain   This is a new problem. The current episode started in the past 7 days. The problem occurs constantly (constant, throbbing pain over left lower molar area.          The problem has been worsening. Pertinent negatives include no chills, congestion, fatigue, fever, nausea, visual change or vomiting. Chewing aggravates the symptoms. Pt has tried tylenol for the symptoms - no improvement.       #2.  Multiple sclerosis:   also has gait issues.   Not on DMARD.    Recently moved, so does not have rheumatologist.       #3. Left foot pain:   chronic.   Worse in past month, worse with prolonged standing, walking.           Social History     Tobacco Use    Smoking status: Every Day     Current packs/day: 0.50     Average packs/day: 0.5 packs/day for 19.0 years (9.5 ttl pk-yrs)     Types: Cigarettes    Smokeless tobacco: Never   Substance Use Topics    Alcohol use: No    Drug use: No         Current Outpatient Medications on File Prior to Visit   Medication Sig Dispense Refill    ibuprofen (MOTRIN) 200 MG Tab Take 200 mg by mouth every 6 hours as needed.      B Complex-C (SUPER B COMPLEX PO) Take  by mouth.      vitamin E (VITAMIN E) 1000 UNIT Cap Take 1 Cap by mouth every day.      vitamin D (CHOLECALCIFEROL) 1000 UNIT Tab Take 1,000 Units by mouth every day.      POTASSIUM PO Take  by mouth.      ascorbic acid (ASCORBIC ACID) 500 MG Tab Take 500 mg by mouth every day.      aspirin (ASA) 81 MG Chew Tab chewable " "tablet Take 81 mg by mouth every day.       No current facility-administered medications on file prior to visit.         No past medical history on file.    Review of Systems   Constitutional: Negative for fever, chills and fatigue.   HENT: Positive for facial swelling. Negative for congestion.    Gastrointestinal: Negative for nausea and vomiting.          Objective:     /86 (BP Location: Right arm, Patient Position: Sitting, BP Cuff Size: Large adult)   Pulse 100   Temp 36.4 °C (97.6 °F) (Temporal)   Resp 16   Ht 1.803 m (5' 11\")   Wt 111 kg (245 lb 6.4 oz)   SpO2 97%     Physical Exam   Constitutional: pt is oriented to person, place, and time. Pt appears well-developed. No distress.   HENT:   Head: Normocephalic and atraumatic.   Mouth/Throat:   Dentition is overall very poor.   + extensive decay and multiple broken, missing teeth.   Gingival swelling and tenderness around area where 2nd   Lower     Right  molar used to be  Eyes: Conjunctivae are normal. Pupils are equal, round, and reactive to light. No scleral icterus.   Cardiovascular: Normal rate and regular rhythm.    Pulmonary/Chest: Effort normal and breath sounds normal. No respiratory distress. Pt has no wheezes.   Neurological: pt is alert and oriented to person, place, and time. No cranial nerve deficit.   Left ankle/foot:   + neurovasc intact.   No erythema.    + trace edema.     + TTP over lateral mallelous  Rt foot:   large callus over heel.         Skin: Skin is warm. He is not diaphoretic. No erythema.   Psychiatric:  behavior is normal.   Nursing note and vitals reviewed.              Assessment/Plan:        1. Dental decay  Will need all teeth extracted and there is significant decay, likely infection, but no drainable abscess      - Referral to Oral Maxillofacial Surgery  - amoxicillin-clavulanate (AUGMENTIN) 875-125 MG Tab; Take 1 Tablet by mouth 2 times a day for 7 days.  Dispense: 14 Tablet; Refill: 0  - oxyCODONE " immediate-release (ROXICODONE) 5 MG Tab; Take 1 Tablet by mouth every 6 hours as needed for Severe Pain for up to 7 days.  Dispense: 10 Tablet; Refill: 0    2. Chronic pain of left ankle     - Referral to Orthopedics  - oxyCODONE immediate-release (ROXICODONE) 5 MG Tab; Take 1 Tablet by mouth every 6 hours as needed for Severe Pain for up to 7 days.  Dispense: 10 Tablet; Refill: 0      Narcotic use report obtained with no indication of narcotic overuse or misuse and deemed necessary for treaatment. Sedation, dependence, and constipation precautions given. Avoid use while driving or operating heavy machinery.     3. Callus     - ammonium lactate (LAC-HYDRIN) 12 % Lotion; Apply 1 Application topically 2 times a day.  Dispense: 396 g; Refill: 0    4. MS (multiple sclerosis) (HCC)     - Referral to Rheumatology          Differential diagnosis, natural history, supportive care, and indications for immediate follow-up discussed. All questions answered. Patient agrees with the plan of care.     Follow-up as needed if symptoms worsen or fail to improve to PCP, Urgent care or Emergency Room.     I have personally reviewed prior external notes and test results pertinent to today's visit.  I have independently reviewed and interpreted all diagnostics ordered during this urgent care acute visit.

## 2024-09-08 DIAGNOSIS — M06.9 RHEUMATOID ARTHRITIS, INVOLVING UNSPECIFIED SITE, UNSPECIFIED WHETHER RHEUMATOID FACTOR PRESENT (HCC): ICD-10-CM

## 2024-12-27 ENCOUNTER — OFFICE VISIT (OUTPATIENT)
Dept: URGENT CARE | Facility: PHYSICIAN GROUP | Age: 39
End: 2024-12-27
Payer: MEDICAID

## 2024-12-27 ENCOUNTER — APPOINTMENT (OUTPATIENT)
Dept: RADIOLOGY | Facility: IMAGING CENTER | Age: 39
End: 2024-12-27
Attending: FAMILY MEDICINE
Payer: MEDICAID

## 2024-12-27 VITALS
BODY MASS INDEX: 31.64 KG/M2 | HEART RATE: 101 BPM | OXYGEN SATURATION: 97 % | DIASTOLIC BLOOD PRESSURE: 70 MMHG | SYSTOLIC BLOOD PRESSURE: 124 MMHG | HEIGHT: 71 IN | RESPIRATION RATE: 16 BRPM | TEMPERATURE: 97.4 F | WEIGHT: 226 LBS

## 2024-12-27 DIAGNOSIS — K59.00 CONSTIPATION, UNSPECIFIED CONSTIPATION TYPE: ICD-10-CM

## 2024-12-27 DIAGNOSIS — R05.1 ACUTE COUGH: ICD-10-CM

## 2024-12-27 DIAGNOSIS — Z59.00 UNHOUSED PERSON: Primary | ICD-10-CM

## 2024-12-27 LAB
FLUAV RNA SPEC QL NAA+PROBE: POSITIVE
FLUBV RNA SPEC QL NAA+PROBE: NEGATIVE
RSV RNA SPEC QL NAA+PROBE: NEGATIVE
S PYO DNA SPEC NAA+PROBE: NOT DETECTED
SARS-COV-2 RNA RESP QL NAA+PROBE: NEGATIVE

## 2024-12-27 PROCEDURE — 87637 SARSCOV2&INF A&B&RSV AMP PRB: CPT | Mod: QW | Performed by: FAMILY MEDICINE

## 2024-12-27 PROCEDURE — 99214 OFFICE O/P EST MOD 30 MIN: CPT | Performed by: FAMILY MEDICINE

## 2024-12-27 PROCEDURE — 3074F SYST BP LT 130 MM HG: CPT | Performed by: FAMILY MEDICINE

## 2024-12-27 PROCEDURE — 74019 RADEX ABDOMEN 2 VIEWS: CPT | Mod: TC,FY | Performed by: RADIOLOGY

## 2024-12-27 PROCEDURE — 3078F DIAST BP <80 MM HG: CPT | Performed by: FAMILY MEDICINE

## 2024-12-27 PROCEDURE — 87651 STREP A DNA AMP PROBE: CPT | Performed by: FAMILY MEDICINE

## 2024-12-27 PROCEDURE — 71046 X-RAY EXAM CHEST 2 VIEWS: CPT | Mod: TC,FY | Performed by: RADIOLOGY

## 2024-12-27 RX ORDER — OSELTAMIVIR PHOSPHATE 75 MG/1
75 CAPSULE ORAL 2 TIMES DAILY
Qty: 10 CAPSULE | Refills: 0 | Status: SHIPPED | OUTPATIENT
Start: 2024-12-27 | End: 2025-01-01

## 2024-12-27 RX ORDER — LACTULOSE 10 G/15ML
10 SOLUTION ORAL
Qty: 60 ML | Refills: 0 | Status: SHIPPED | OUTPATIENT
Start: 2024-12-27

## 2024-12-27 SDOH — ECONOMIC STABILITY - HOUSING INSECURITY: HOMELESSNESS UNSPECIFIED: Z59.00

## 2024-12-28 NOTE — PROGRESS NOTES
"Chief Complaint   Patient presents with    Cough     X 2 days    Pharyngitis     X 2 days         Cough  This is a new problem. The current episode started yesterday. The problem has been unchanged. The problem occurs constantly. The cough is dry. Associated symptoms include : fatigue, headaches, chills, muscle aches, fever. Pertinent negatives include no   nausea, vomiting, diarrhea, sweats, weight loss or wheezing. Nothing aggravates the symptoms.  Patient has tried nothing for the symptoms. There is no history of asthma.            #2.   Constipation:   no BM x 2 d.    Denies n/v.            #3.   Pt relates to me that she is having serious housing issues and lost all her clothes since her house was \"condemned\" and is requesting assistance through social work.                 No past medical history on file.      Social History     Tobacco Use    Smoking status: Every Day     Current packs/day: 0.50     Average packs/day: 0.5 packs/day for 19.0 years (9.5 ttl pk-yrs)     Types: Cigarettes    Smokeless tobacco: Never   Substance Use Topics    Alcohol use: No    Drug use: No           Family History   Problem Relation Age of Onset    Thyroid Mother         hypothyroid    Cancer Mother         breast     Bipolar disorder Father     Other Paternal Grandfather         suicide                    Review of Systems   Constitutional: positive for fever and chills  HENT: negative for otalgia, sore throat  Cardiovascular - denies chest pain or dyspnea  Respiratory: Positive for cough.  .  Negative for wheezing.    Neurological: Negative for headaches, dizziness   GI - denies nausea, vomiting or diarrhea   - denies dysuria, discharge  Psych - denies depression, anxiety  Neuro - denies numbness or tingling.   10 point ROS otherwise negative, except per HPI             Objective:     /70   Pulse (!) 101   Temp 36.3 °C (97.4 °F) (Temporal)   Resp 16   Ht 1.803 m (5' 11\")   Wt 103 kg (226 lb)   SpO2 97% "       Physical Exam   Constitutional: patient is oriented to person, place, and time. Patient appears well-developed and well-nourished. No distress.   HENT:   Head: Normocephalic and atraumatic.   Right Ear: External ear normal.   Left Ear: External ear normal.   TMs normal  Nose: Mucosal edema  present. Right sinus exhibits no maxillary sinus tenderness. Left sinus exhibits no maxillary sinus tenderness.   Mouth/Throat: Mucous membranes are normal. No oral lesions.  No posterior pharyngeal erythema.  No oropharyngeal exudate or posterior oropharyngeal edema.   Eyes: Conjunctivae and EOM are normal. Pupils are equal, round, and reactive to light. Right eye exhibits no discharge. Left eye exhibits no discharge. No scleral icterus.   Neck: Normal range of motion. Neck supple. No tracheal deviation present.   Cardiovascular: Normal rate, regular rhythm and normal heart sounds.  Exam reveals no friction rub.    Pulmonary/Chest: Effort normal. No respiratory distress. Patient has no wheezes or rhonchi. Patient has no rales.    Abdominal -  Soft.   Nontender.   Bowel sounds are normal.   There is no hepatosplenomegaly.   No McBurney's point tenderness.   No flank pain.     Musculoskeletal:  exhibits no edema.   Lymphadenopathy:     Patient has no cervical adenopathy.      Neurological: patient is alert and oriented to person, place, and time.   Skin: Skin is warm and dry. No rash noted. No erythema.   Psychiatric: patient  has a normal mood and affect.  behavior is normal.   Nursing note and vitals reviewed.    DX-CHEST-2 VIEWS  Order: [id number]   Status: Final result       Visible to patient: No ([facility])       Next appt: None       Dx: Acute cough    0 Result Notes  Details    Reading Physician Reading Date Result Priority   Luke Olson M.D.  [phone] 12/27/2024      Narrative & Impression     12/27/2024 5:54 PM     HISTORY/REASON FOR EXAM:  cough; Cough        TECHNIQUE/EXAM DESCRIPTION AND  NUMBER OF VIEWS:  Two views of the chest.     COMPARISON:  None.     FINDINGS:     No pulmonary infiltrates or consolidations are noted.  No pleural effusions, no pneumothorax are appreciated.  Normal cardiopericardial silhouette.     IMPRESSION:        1. No active cardiopulmonary abnormalities are identified.        Exam Ended: 12/27/24  6:12 PM Last Resulted: 12/27/24  6:14 PM     X-ABDOMEN-2 VIEWS  Order: [id number]   Status: Final result       Visible to patient: No (inaccessible in MyChart)       Next appt: None       Dx: Acute cough    0 Result Notes  Details    Reading Physician Reading Date Result Priority   Luke Cabezas M.D.  [phone] 12/27/2024      Narrative & Impression     12/27/2024 5:54 PM     HISTORY/REASON FOR EXAM:  Gastrointestinal Complaint; constipation        TECHNIQUE/EXAM DESCRIPTION AND NUMBER OF VIEWS:  Flat and upright views of the abdomen.     COMPARISON: None     FINDINGS:  Flat and upright views of the abdomen show no free air, or unusual calcification. There is a moderate amount of stool throughout the colon. Mild gaseous distention of the transverse colon and distal small bowel.     IMPRESSION:     1.  Moderate constipation.        Exam Ended: 12/27/24  6:13 PM Last Resulted: 12/27/24  6:16 PM       Assesment/Plan:        1. Influenza A       Chest x-ray was personally interpreted and reviewed. No acute cardiopulmonary findings. No pulmonary infiltrates or densities. Cardiac silhouette is normal. No hemidiaphragm elevation. No bony abnormalities.      Rx tamiflu      Differential diagnosis, natural history, supportive care, and indications for immediate follow-up discussed. All questions answered. Patient agrees with the plan of care.     Follow-up as needed if symptoms worsen or fail to improve to PCP, Urgent care or Emergency Room.     I have personally reviewed prior external notes and test results pertinent to today's visit.  I have independently reviewed and interpreted  all diagnostics ordered during this urgent care acute visit.         2. Constipation, unspecified constipation type   Xray shows no obstruction, just moderate stool        - lactulose 10 g/15mL Solution; Take 15 mL by mouth 1 time a day as needed (CONSTIPATION).  Dispense: 60 mL; Refill: 0    Follow up in one week if no improvement, sooner if symptoms worsen.

## 2024-12-31 DIAGNOSIS — Z00.00 ROUTINE HEALTH MAINTENANCE: ICD-10-CM

## 2025-02-18 DIAGNOSIS — Z00.00 ROUTINE HEALTH MAINTENANCE: ICD-10-CM

## 2025-03-20 ENCOUNTER — TELEPHONE (OUTPATIENT)
Dept: HEALTH INFORMATION MANAGEMENT | Facility: OTHER | Age: 40
End: 2025-03-20
Payer: MEDICAID

## 2025-06-24 ENCOUNTER — OFFICE VISIT (OUTPATIENT)
Dept: URGENT CARE | Facility: PHYSICIAN GROUP | Age: 40
End: 2025-06-24
Payer: MEDICAID

## 2025-06-24 ENCOUNTER — APPOINTMENT (OUTPATIENT)
Dept: RADIOLOGY | Facility: IMAGING CENTER | Age: 40
End: 2025-06-24
Attending: PHYSICIAN ASSISTANT
Payer: MEDICAID

## 2025-06-24 ENCOUNTER — APPOINTMENT (OUTPATIENT)
Dept: URGENT CARE | Facility: PHYSICIAN GROUP | Age: 40
End: 2025-06-24

## 2025-06-24 VITALS
HEART RATE: 90 BPM | DIASTOLIC BLOOD PRESSURE: 62 MMHG | SYSTOLIC BLOOD PRESSURE: 128 MMHG | TEMPERATURE: 98 F | HEIGHT: 70 IN | OXYGEN SATURATION: 96 % | WEIGHT: 217 LBS | BODY MASS INDEX: 31.07 KG/M2 | RESPIRATION RATE: 16 BRPM

## 2025-06-24 DIAGNOSIS — M79.671 RIGHT FOOT PAIN: ICD-10-CM

## 2025-06-24 DIAGNOSIS — S92.351A CLOSED FRACTURE OF BASE OF FIFTH METATARSAL BONE OF RIGHT FOOT, INITIAL ENCOUNTER: ICD-10-CM

## 2025-06-24 PROCEDURE — 3074F SYST BP LT 130 MM HG: CPT | Performed by: PHYSICIAN ASSISTANT

## 2025-06-24 PROCEDURE — 99214 OFFICE O/P EST MOD 30 MIN: CPT | Performed by: PHYSICIAN ASSISTANT

## 2025-06-24 PROCEDURE — 3078F DIAST BP <80 MM HG: CPT | Performed by: PHYSICIAN ASSISTANT

## 2025-06-24 RX ORDER — METHYLPREDNISOLONE 4 MG/1
TABLET ORAL
Qty: 21 TABLET | Refills: 0 | Status: SHIPPED | OUTPATIENT
Start: 2025-06-24

## 2025-06-25 NOTE — Clinical Note
REFERRAL APPROVAL NOTICE         Sent on June 25, 2025                   Marge Goodman  [address] [address]                   Dear Ms. Goodman,    After a careful review of the medical information and benefit coverage, [facility] has processed your referral. See below for additional details.    If applicable, you must be actively enrolled with your insurance for coverage of the authorized service. If you have any questions regarding your coverage, please contact your insurance directly.    REFERRAL INFORMATION   Referral #:  [id number]  Referred-To Provider    Referred-By Provider:  Podiatry    Leighann Reid P.A.-C.   [facility]      [address]  [address]  [facility] [id number] [phone] [address] [facility] [address]  [phone]    Referral Start Date:  06/25/2025  Referral End Date:   06/25/2026             SCHEDULING  If you do not already have an appointment, please call [phone] to make an appointment.     MORE INFORMATION  If you do not already have a [employer] account, sign up at: [email or web address]  You can access your medical information, make appointments, see lab results, billing information, and more.  If you have questions regarding this referral, please contact  the [facility] Referrals department at:             [phone]. Monday - Friday 8:00AM - 5:00PM.     Sincerely,    [facility]

## 2025-06-25 NOTE — PROGRESS NOTES
"Subjective     Marge Goodman is a 40 y.o. female who presents with Foot Problem ((R), pt states she waiting for her correct shoes for the foot but is in a lot of pain) and Referral Needed    HPI:  Marge Goodman is a 40 y.o. female who presents today for evaluation of right foot pain/swelling. Has longstanding issues with her foot. Started to have worsening pain/swelling on outside/top of foot over the last week or so. Denies any injury. Can't recall any previous fracture or dislocation. States that she ordered some new shoes with better cushion/arch support but they have not arrived yet. Currently, she is wearing some animal paw slippers which she says feel better than regular shoes.         ROS    PMH:  has no past medical history on file.  MEDS: Current Medications[1]  ALLERGIES: Allergies[2]  SURGHX: Past Surgical History[3]  SOCHX:  reports that she has been smoking cigarettes. She has a 9.5 pack-year smoking history. She has never used smokeless tobacco. She reports that she does not drink alcohol and does not use drugs.  FH: Family history was reviewed, no pertinent findings to report        Objective     /62   Pulse 90   Temp 36.7 °C (98 °F) (Temporal)   Resp 16   Ht 1.765 m (5' 9.5\")   Wt 98.4 kg (217 lb)   LMP 07/05/2014   SpO2 96%   BMI 31.59 kg/m²      Physical Exam  Constitutional:       General: She is not in acute distress.     Appearance: She is not diaphoretic.   HENT:      Head: Normocephalic and atraumatic.      Right Ear: External ear normal.      Left Ear: External ear normal.   Eyes:      Conjunctiva/sclera: Conjunctivae normal.      Pupils: Pupils are equal, round, and reactive to light.   Pulmonary:      Effort: Pulmonary effort is normal. No respiratory distress.   Musculoskeletal:      Cervical back: Normal range of motion.      Right foot: Swelling, tenderness and bony tenderness present.      Comments: Right foot with some mild tissue swelling. No overlying erythema " or ecchymosis. There is TTP along lateral aspect of foot, especially over base of the 5th metatarsal. Also some TTP on dorsum of foot. Distal N/V status intact. Brisk cap refill.   Skin:     Findings: No rash.   Neurological:      Mental Status: She is alert and oriented to person, place, and time.   Psychiatric:         Mood and Affect: Mood and affect normal.         Cognition and Memory: Memory normal.         Judgment: Judgment normal.         DX-FOOT-COMPLETE 3+ RIGHT  FINDINGS:  No focal soft tissue swelling.  Cortical lucency with overlying contour deformity at the base of 5th metatarsal laterally.  No fracture or dislocation.  No radiopaque foreign body.     IMPRESSION:  1.  No fracture or dislocation of RIGHT foot.  2.  Ill-defined lucency within the cortex of the base of 5th metatarsal laterally with overlying convex deformity may indicate healing fracture, or indolent inflammatory/infectious process.  Clinical follow-up recommended.    *X-rays were reviewed and interpreted independently by me. I agree with the radiologist's findings     Assessment & Plan     1. Right foot pain  - DX-FOOT-COMPLETE 3+ RIGHT; Future  - methylPREDNISolone (MEDROL DOSEPAK) 4 MG Tablet Therapy Pack; Follow schedule on package instructions.  Dispense: 21 Tablet; Refill: 0  - Referral to Podiatry    2. Closed fracture of base of fifth metatarsal bone of right foot, initial encounter  - Referral to Podiatry    Patient presenting for evaluation of right foot pain.  X-ray seems to be a chronic issue for her but symptoms worsened over the last week.  No known injury but she was having significant tenderness over the base of the fifth metatarsal so imaging was ordered.  Imaging showed possible healing fracture of the base of the fifth metatarsal versus infectious/inflammatory process.  Patient placed in a tall walking boot.  RICE therapy and use of OTC analgesics discussed.  Will also trial a Medrol Dosepak to see if that helps with  the inflammation as she has not been getting significant improvement with OTC meds.  Referral to podiatry placed for more definitive evaluation and management.                Differential Diagnosis, natural history, and supportive care discussed. Return to the Urgent Care or follow up with your PCP if symptoms fail to resolve, or for any new or worsening symptoms. Emergency room precautions discussed. Patient and/or family appears understanding of information.           [1]   Current Outpatient Medications:     methylPREDNISolone (MEDROL DOSEPAK) 4 MG Tablet Therapy Pack, Follow schedule on package instructions., Disp: 21 Tablet, Rfl: 0    ibuprofen (MOTRIN) 200 MG Tab, Take 200 mg by mouth every 6 hours as needed., Disp: , Rfl:     ammonium lactate (LAC-HYDRIN) 12 % Lotion, Apply 1 Application topically 2 times a day., Disp: 396 g, Rfl: 0    B Complex-C (SUPER B COMPLEX PO), Take  by mouth., Disp: , Rfl:     vitamin E (VITAMIN E) 1000 UNIT Cap, Take 1 Cap by mouth every day., Disp: , Rfl:     vitamin D (CHOLECALCIFEROL) 1000 UNIT Tab, Take 1,000 Units by mouth every day., Disp: , Rfl:     POTASSIUM PO, Take  by mouth., Disp: , Rfl:     ascorbic acid (ASCORBIC ACID) 500 MG Tab, Take 500 mg by mouth every day., Disp: , Rfl:     aspirin (ASA) 81 MG Chew Tab chewable tablet, Take 81 mg by mouth every day., Disp: , Rfl:     lactulose 10 g/15mL Solution, Take 15 mL by mouth 1 time a day as needed (CONSTIPATION). (Patient not taking: Reported on 6/24/2025), Disp: 60 mL, Rfl: 0  [2]   Allergies  Allergen Reactions    Clindamycin      Difficulty breathing    Percocet [Oxycodone-Acetaminophen]      Severe migraine    Vicodin [Hydrocodone-Acetaminophen]      Migraines   [3]   Past Surgical History:  Procedure Laterality Date    PRIMARY C SECTION      2006